# Patient Record
Sex: MALE | Race: WHITE | NOT HISPANIC OR LATINO | Employment: OTHER | URBAN - METROPOLITAN AREA
[De-identification: names, ages, dates, MRNs, and addresses within clinical notes are randomized per-mention and may not be internally consistent; named-entity substitution may affect disease eponyms.]

---

## 2022-07-26 ENCOUNTER — HOSPITAL ENCOUNTER (OUTPATIENT)
Dept: LAB | Facility: MEDICAL CENTER | Age: 82
End: 2022-07-26
Attending: OPHTHALMOLOGY
Payer: MEDICARE

## 2022-07-26 ENCOUNTER — OFFICE VISIT (OUTPATIENT)
Dept: OPHTHALMOLOGY | Facility: MEDICAL CENTER | Age: 82
End: 2022-07-26
Payer: MEDICARE

## 2022-07-26 DIAGNOSIS — H43.9: ICD-10-CM

## 2022-07-26 DIAGNOSIS — H49.9 OPHTHALMOPLEGIA: ICD-10-CM

## 2022-07-26 DIAGNOSIS — H40.003 GLAUCOMA SUSPECT OF BOTH EYES: ICD-10-CM

## 2022-07-26 DIAGNOSIS — H52.203 MYOPIA OF BOTH EYES WITH ASTIGMATISM: ICD-10-CM

## 2022-07-26 DIAGNOSIS — Z96.1 PSEUDOPHAKIA OF BOTH EYES: ICD-10-CM

## 2022-07-26 DIAGNOSIS — H52.13 MYOPIA OF BOTH EYES WITH ASTIGMATISM: ICD-10-CM

## 2022-07-26 PROCEDURE — 36415 COLL VENOUS BLD VENIPUNCTURE: CPT

## 2022-07-26 PROCEDURE — 83519 RIA NONANTIBODY: CPT

## 2022-07-26 PROCEDURE — 84439 ASSAY OF FREE THYROXINE: CPT

## 2022-07-26 PROCEDURE — 92015 DETERMINE REFRACTIVE STATE: CPT | Performed by: OPHTHALMOLOGY

## 2022-07-26 PROCEDURE — 92250 FUNDUS PHOTOGRAPHY W/I&R: CPT | Performed by: OPHTHALMOLOGY

## 2022-07-26 PROCEDURE — 92060 SENSORIMOTOR EXAMINATION: CPT | Performed by: OPHTHALMOLOGY

## 2022-07-26 PROCEDURE — 84443 ASSAY THYROID STIM HORMONE: CPT

## 2022-07-26 PROCEDURE — 83516 IMMUNOASSAY NONANTIBODY: CPT | Mod: XU

## 2022-07-26 PROCEDURE — 92004 COMPRE OPH EXAM NEW PT 1/>: CPT | Mod: 25 | Performed by: OPHTHALMOLOGY

## 2022-07-26 RX ORDER — AZELASTINE 1 MG/ML
SPRAY, METERED NASAL
COMMUNITY
Start: 2022-06-02

## 2022-07-26 RX ORDER — LISINOPRIL 10 MG/1
10 TABLET ORAL
COMMUNITY
Start: 2022-06-02

## 2022-07-26 RX ORDER — TERAZOSIN 5 MG/1
CAPSULE ORAL
COMMUNITY
Start: 2022-06-02

## 2022-07-26 ASSESSMENT — REFRACTION
OS_CYLINDER: +1.75
OD_CYLINDER: +1.75
OS_SPHERE: -0.75
OD_SPHERE: -2.00
OD_AXIS: 006
OS_AXIS: 177

## 2022-07-26 ASSESSMENT — VISUAL ACUITY
OS_SC: J5
OS_SC: 20/25
OD_SC: J5
OD_SC+: -1
OS_PH_SC+: -1
METHOD: SNELLEN - LINEAR
OS_PH_SC: 20/20
OD_PH_SC: 20/25
OD_PH_SC+: -2
OD_SC: 20/60

## 2022-07-26 ASSESSMENT — TONOMETRY
OD_IOP_MMHG: 17
OS_IOP_MMHG: 19
IOP_METHOD: I-CARE

## 2022-07-26 ASSESSMENT — EXTERNAL EXAM - LEFT EYE: OS_EXAM: NORMAL

## 2022-07-26 ASSESSMENT — REFRACTION_MANIFEST
OD_SPHERE: -1.75
OS_SPHERE: -0.75
OS_CYLINDER: +1.75
OD_AXIS: 003
OD_CYLINDER: +1.50
METHOD_AUTOREFRACTION: 1
OS_AXIS: 174

## 2022-07-26 ASSESSMENT — SLIT LAMP EXAM - LIDS
COMMENTS: NORMAL
COMMENTS: NORMAL

## 2022-07-26 ASSESSMENT — CONF VISUAL FIELD
OS_NORMAL: 1
OD_NORMAL: 1

## 2022-07-26 ASSESSMENT — CUP TO DISC RATIO
OS_RATIO: 0.1
OD_RATIO: 0.1

## 2022-07-26 ASSESSMENT — EXTERNAL EXAM - RIGHT EYE: OD_EXAM: NORMAL

## 2022-07-26 ASSESSMENT — ENCOUNTER SYMPTOMS
HEADACHES: 1
DOUBLE VISION: 1
BLURRED VISION: 1

## 2022-07-26 NOTE — ASSESSMENT & PLAN NOTE
7/26/2022 - small incomitant E(T) worse on left gaze with mild abduction deficit of the OS. 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 Also in the differential would be early thyroid orbitopathy vs myasthenia. Will therefore  give rx to see if can better control. Hold off on prisms for now, obtain TFT and myasthenia testing. Dicussed that if progressive will precede with neuroimaging.

## 2022-07-26 NOTE — PROGRESS NOTES
Peds/Neuro Ophthalmology:   Vick Hagen M.D.    Date & Time note created:    7/26/2022   4:52 PM     Referring MD / APRN:  No primary care provider on file., No att. providers found    Patient ID:  Name:             Moe Dumont     YOB: 1940  Age:                 82 y.o.  male   MRN:               [id number]    Chief Complaint/Reason for Visit:     Other (New patient for double vision in both eyes getting worse)      History of Present Illness:    Moe Dumont is a 82 y.o. male   New patient for double vision in both eyes getting worse. Pt states vision with both eyes is stable. Only uses glasses for near and fine print. Pt states he has had 3 episodes of double vision. Has to cover the right eye so the double vision goes away. They double vision only last 4 to 5 minutes. Pt 1st thing in the morning get itchy and watery thru out the day. Pt did get a headache after last episodes of double vision. Pt does have history of migraines. Does have 3 different times he has gotten hit in the back of his head really bad.       Review of Systems:  Review of Systems   Eyes: Positive for blurred vision and double vision.        Itchy eyes ou  Watery eyes OU   Neurological: Positive for headaches.   All other systems reviewed and are negative.      Past Medical History:   Past Medical History:   Diagnosis Date   • Hypertension    • Prostate disorder        Past Surgical History:  Past Surgical History:   Procedure Laterality Date   • ANKLE TOTAL Right        Current Outpatient Medications:  Current Outpatient Medications   Medication Sig Dispense Refill   • lisinopril (PRINIVIL) 10 MG Tab Take 10 mg by mouth every day.     • terazosin (HYTRIN) 5 MG Cap TAKE 1 CAPSULE BY MOUTH EVERY DAY AT BEDTIME     • azelastine (ASTELIN) 137 MCG/SPRAY nasal spray SPRAY 1 SPRAY IN EACH NOSTRIL TWICE DAILY     • Apoaequorin (PREVAGEN PO) Take  by mouth.     • Misc Natural Products (URINOZINC PO) Take  by mouth.       No  current facility-administered medications for this visit.       Allergies:  Allergies   Allergen Reactions   • Seasonal        Family History:  Family History   Problem Relation Age of Onset   • Lung Disease Father    • Cancer Father    • Cancer Sister    • Hypertension Sister        Social History:  Social History     Socioeconomic History   • Marital status:      Spouse name: Not on file   • Number of children: Not on file   • Years of education: Not on file   • Highest education level: Not on file   Occupational History   • Not on file   Tobacco Use   • Smoking status: Former Smoker     Types: Cigarettes   • Smokeless tobacco: Never Used   Vaping Use   • Vaping Use: Never used   Substance and Sexual Activity   • Alcohol use: Yes     Comment: Socially   • Drug use: Never   • Sexual activity: Not on file   Other Topics Concern   • Not on file   Social History Narrative    Retired      Social Determinants of Health     Financial Resource Strain: Not on file   Food Insecurity: Not on file   Transportation Needs: Not on file   Physical Activity: Not on file   Stress: Not on file   Social Connections: Not on file   Intimate Partner Violence: Not on file   Housing Stability: Not on file          Physical Exam:  Physical Exam    Oriented x 3  Weight/BMI: There is no height or weight on file to calculate BMI.  There were no vitals taken for this visit.    Base Eye Exam     Visual Acuity (Snellen - Linear)       Right Left    Dist sc 20/60 -1 20/25    Dist ph sc 20/25 -2 20/20 -1    Near sc J5 J5          Tonometry (I-care, 1:06 PM)       Right Left    Pressure 17 19          Pupils       Pupils    Right PERRL    Left PERRL          Visual Fields       Right Left     Full Full          Neuro/Psych     Oriented x3: Yes    Mood/Affect: Normal          Dilation     Both eyes: Tropicamide (MYDRIACYL) 1% ophthalmic solution, Phenylephrine (NEOSYNEPHRINE) ophthalmic solution 2.5%, Cyclopentolate (CYCLOGYL) 1% ophthalmic  solution @ 4:44 PM            Additional Tests     Color       Right Left    Wandy 12/12 12/12          Stereo     Fly: +    Animals: 3/3    Circles: 3/9            Strabismus Exam       0 0 0   0 0 - -                      E(T) 2 0  0  E(T) 2 0  -1/4  ET 4                     0 0 0   0 0 - -                   Slit Lamp and Fundus Exam     External Exam       Right Left    External Normal Normal          Slit Lamp Exam       Right Left    Lids/Lashes Normal Normal    Conjunctiva/Sclera White and quiet White and quiet    Cornea Clear Clear    Anterior Chamber Deep and quiet Deep and quiet    Iris Round and reactive Round and reactive    Lens Posterior chamber intraocular lens Posterior chamber intraocular lens    Vitreous Normal Normal          Fundus Exam       Right Left    Disc Normal Normal    C/D Ratio 0.1 0.1    Macula Normal Normal    Vessels Normal Normal    Periphery Normal Normal            Refraction     Manifest Refraction (Auto)       Sphere Cylinder Axis    Right -1.75 +1.50 003    Left -0.75 +1.75 174          Cycloplegic Refraction (Auto)       Sphere Cylinder Axis    Right -2.00 +1.75 006    Left -0.75 +1.75 177          Final Rx       Sphere Cylinder Axis Add    Right -1.75 +1.50 180 +2.75    Left -0.75 +1.50 180 +2.75                Pertinent Lab/Test/Imaging Review:      Assessment and Plan:     Myopia of both eyes with astigmatism  7/26/2022 - uncorrected myopia and astigmatism post cataract extraction. Will give rx to se if can control the E(T) better.     Ophthalmoplegia  7/26/2022 - small incomitant E(T) worse on left gaze with mild abduction deficit of the OS. 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 Also in the differential would be early thyroid orbitopathy vs myasthenia. Will therefore  give rx to see if can better control. Hold off on prisms for now, obtain TFT and myasthenia testing. Dicussed that if  progressive will precede with neuroimaging.     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     Pseudophakia of both eyes  iol in place        Vick Hagen M.D.

## 2022-07-26 NOTE — ASSESSMENT & PLAN NOTE
7/26/2022 - uncorrected myopia and astigmatism post cataract extraction. Will give rx to se if can control the E(T) better.

## 2022-07-27 LAB
T4 FREE SERPL-MCNC: 0.9 NG/DL (ref 0.93–1.7)
TSH SERPL DL<=0.005 MIU/L-ACNC: 3.36 UIU/ML (ref 0.38–5.33)

## 2022-07-29 LAB
ACHR BIND AB SER-SCNC: 0 NMOL/L (ref 0–0.4)
ACHR BLOCK AB/ACHR TOTAL SFR SER: 0 % (ref 0–26)

## 2022-08-02 ENCOUNTER — DOCUMENTATION (OUTPATIENT)
Dept: OPHTHALMOLOGY | Facility: MEDICAL CENTER | Age: 82
End: 2022-08-02
Payer: COMMERCIAL

## 2022-08-02 DIAGNOSIS — H49.9 OPHTHALMOPLEGIA: ICD-10-CM

## 2022-08-02 NOTE — PROGRESS NOTES
7/26/2022 - small incomitant E(T) worse on left gaze with mild abduction deficit of the OS. 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 Also in the differential would be early thyroid orbitopathy vs myasthenia. Will therefore  give rx to see if can better control. Hold off on prisms for now, obtain TFT and myasthenia testing. Dicussed that if progressive will precede with neuroimaging.   8/2/2022 - T4 slightly low, TSH normal, Ach binding blocking negative

## 2022-11-08 ENCOUNTER — TELEPHONE (OUTPATIENT)
Dept: OPHTHALMOLOGY | Facility: MEDICAL CENTER | Age: 82
End: 2022-11-08